# Patient Record
Sex: MALE | Race: WHITE
[De-identification: names, ages, dates, MRNs, and addresses within clinical notes are randomized per-mention and may not be internally consistent; named-entity substitution may affect disease eponyms.]

---

## 2022-05-13 ENCOUNTER — P2P PATIENT RECORD (OUTPATIENT)
Age: 47
End: 2022-05-13

## 2022-05-19 ENCOUNTER — DASHBOARD ENCOUNTERS (OUTPATIENT)
Age: 47
End: 2022-05-19

## 2022-05-19 ENCOUNTER — LAB OUTSIDE AN ENCOUNTER (OUTPATIENT)
Dept: URBAN - METROPOLITAN AREA CLINIC 74 | Facility: CLINIC | Age: 47
End: 2022-05-19

## 2022-05-19 ENCOUNTER — WEB ENCOUNTER (OUTPATIENT)
Dept: URBAN - METROPOLITAN AREA CLINIC 74 | Facility: CLINIC | Age: 47
End: 2022-05-19

## 2022-05-19 ENCOUNTER — OFFICE VISIT (OUTPATIENT)
Dept: URBAN - METROPOLITAN AREA CLINIC 74 | Facility: CLINIC | Age: 47
End: 2022-05-19
Payer: COMMERCIAL

## 2022-05-19 DIAGNOSIS — K21.9 GASTROESOPHAGEAL REFLUX DISEASE, UNSPECIFIED WHETHER ESOPHAGITIS PRESENT: ICD-10-CM

## 2022-05-19 DIAGNOSIS — R74.01 ELEVATED ALT MEASUREMENT: ICD-10-CM

## 2022-05-19 DIAGNOSIS — E78.2 MIXED HYPERLIPIDEMIA: ICD-10-CM

## 2022-05-19 DIAGNOSIS — Z12.11 SCREENING FOR COLON CANCER: ICD-10-CM

## 2022-05-19 DIAGNOSIS — R10.816 EPIGASTRIC ABDOMINAL TENDERNESS WITHOUT REBOUND TENDERNESS: ICD-10-CM

## 2022-05-19 DIAGNOSIS — M62.08 DIASTASIS RECTI: ICD-10-CM

## 2022-05-19 DIAGNOSIS — R12 HEARTBURN: ICD-10-CM

## 2022-05-19 DIAGNOSIS — K76.0 FATTY LIVER: ICD-10-CM

## 2022-05-19 DIAGNOSIS — K42.9 UMBILICAL HERNIA WITHOUT OBSTRUCTION AND WITHOUT GANGRENE: ICD-10-CM

## 2022-05-19 DIAGNOSIS — E66.9 OBESITY (BMI 30-39.9): ICD-10-CM

## 2022-05-19 PROBLEM — 409673008: Status: ACTIVE | Noted: 2022-05-19

## 2022-05-19 PROBLEM — 197321007: Status: ACTIVE | Noted: 2022-05-19

## 2022-05-19 PROBLEM — 267434003: Status: ACTIVE | Noted: 2022-05-19

## 2022-05-19 PROCEDURE — 99204 OFFICE O/P NEW MOD 45 MIN: CPT | Performed by: INTERNAL MEDICINE

## 2022-05-19 PROCEDURE — 99244 OFF/OP CNSLTJ NEW/EST MOD 40: CPT | Performed by: INTERNAL MEDICINE

## 2022-05-19 RX ORDER — SODIUM PICOSULFATE, MAGNESIUM OXIDE, AND ANHYDROUS CITRIC ACID 10; 3.5; 12 MG/160ML; G/160ML; G/160ML
160 ML LIQUID ORAL
Qty: 320 MILLILITER | Refills: 0 | OUTPATIENT
Start: 2022-05-19 | End: 2022-05-20

## 2022-05-19 RX ORDER — PANTOPRAZOLE SODIUM 40 MG/1
1 TABLET TABLET, DELAYED RELEASE ORAL ONCE A DAY
Qty: 90 | Refills: 3 | OUTPATIENT
Start: 2022-05-19

## 2022-05-19 NOTE — PHYSICAL EXAM GASTROINTESTINAL
Abdomen , soft, tender epigastrium,  nondistended , no guarding or rigidity , no masses palpable , normal bowel sounds , Liver and Spleen, no hepatosplenomegaly , liver nontender, diastasis recti, umbilical hernia.

## 2022-05-19 NOTE — HPI-TODAY'S VISIT:
The patient is a 47-year-old white female.  The patient was referred by  for consultation.  A copy of these document is being forwarded to the referring physician.  The patient presents to the office stating that he was advised by his primary care physician to get a screening colonoscopy.  The patient has no family history of colon cancer or colonic polyps.  Currently he is having normal bowel movements, denies having any diarrhea, constipation, rectal bleeding or hematochezia.  The patient also complaint of epigastric tenderness on palpation.  The patient on physical exam presented with diastases recti and an umbilical hernia which might be repaired in the next couple weeks, the patient already has an appointment to be seen by general surgery.  The patient complained of chronic gastroesophageal reflux and heartburn, he denies dysphagia or odynophagia, has near aspiration episodes.  At times he regurgitates sour fluid into his mouth in the evening.  The patient denies having any hematemesis or melena.  The patient was instructed to follow antireflux measures and diet, will start treatment with pantoprazole 40 mg daily.  The patient is being scheduled to have an abdominal ultrasound, and EGD and screening colonoscopy.  Benefits potential complications and alternatives to both endoscopic procedures were disclosed.  The patient will return for a follow-up visit after completion of testing.  Review of records showed that the patient had laboratory obtained on May 13, 2022, a CBC revealed an H&H of 15.1 and 44.4 with a normal white cell count normal differential and platelets.  The patient had a CMP which revealed normal renal function, normal creatinine and BUN, normal albumin, total protein and LFTs except for an ALT of 90.  The patient on the average drinks 2 shots of whiskey every other night, is obese and might have fatty liver.  His triglycerides were 219 with a high normal of 149 and his cholesterol 212 pointing towards mixed hyperlipidemia.  The patient had a normal ferritin, and normal C-reactive protein and normal thyroid testing.

## 2022-05-20 PROBLEM — 396347007: Status: ACTIVE | Noted: 2022-05-19

## 2022-06-01 PROBLEM — 301403003: Status: ACTIVE | Noted: 2022-05-19

## 2022-06-01 PROBLEM — 162864005: Status: ACTIVE | Noted: 2022-05-19

## 2022-06-01 PROBLEM — 62629000: Status: ACTIVE | Noted: 2022-05-19

## 2022-06-03 ENCOUNTER — OFFICE VISIT (OUTPATIENT)
Dept: URBAN - METROPOLITAN AREA CLINIC 73 | Facility: CLINIC | Age: 47
End: 2022-06-03

## 2022-06-06 PROBLEM — 305058001: Status: ACTIVE | Noted: 2022-05-19

## 2022-06-06 PROBLEM — 16331000: Status: ACTIVE | Noted: 2022-05-19

## 2022-06-06 PROBLEM — 235595009: Status: ACTIVE | Noted: 2022-05-19

## 2022-06-10 ENCOUNTER — OFFICE VISIT (OUTPATIENT)
Dept: URBAN - METROPOLITAN AREA CLINIC 73 | Facility: CLINIC | Age: 47
End: 2022-06-10

## 2022-06-21 ENCOUNTER — OFFICE VISIT (OUTPATIENT)
Dept: URBAN - METROPOLITAN AREA SURGERY CENTER 30 | Facility: SURGERY CENTER | Age: 47
End: 2022-06-21

## 2022-08-04 ENCOUNTER — OFFICE VISIT (OUTPATIENT)
Dept: URBAN - METROPOLITAN AREA CLINIC 74 | Facility: CLINIC | Age: 47
End: 2022-08-04